# Patient Record
Sex: MALE | Race: WHITE | NOT HISPANIC OR LATINO | Employment: FULL TIME | URBAN - NONMETROPOLITAN AREA
[De-identification: names, ages, dates, MRNs, and addresses within clinical notes are randomized per-mention and may not be internally consistent; named-entity substitution may affect disease eponyms.]

---

## 2017-01-30 RX ORDER — OMEPRAZOLE 20 MG/1
CAPSULE, DELAYED RELEASE ORAL
Qty: 90 CAPSULE | Refills: 1 | OUTPATIENT
Start: 2017-01-30

## 2017-02-01 RX ORDER — OMEPRAZOLE 20 MG/1
CAPSULE, DELAYED RELEASE ORAL
Qty: 90 CAPSULE | Refills: 1 | OUTPATIENT
Start: 2017-02-01

## 2018-07-09 ENCOUNTER — TELEPHONE (OUTPATIENT)
Dept: PODIATRY | Age: 58
End: 2018-07-09

## 2019-09-18 ENCOUNTER — TELEPHONE (OUTPATIENT)
Dept: GASTROENTEROLOGY | Age: 59
End: 2019-09-18

## 2019-09-18 DIAGNOSIS — Z12.11 SPECIAL SCREENING FOR MALIGNANT NEOPLASMS, COLON: Primary | ICD-10-CM

## 2021-02-01 ENCOUNTER — LAB REQUISITION (OUTPATIENT)
Dept: LAB | Age: 61
End: 2021-02-01

## 2021-02-01 DIAGNOSIS — Z00.00 ENCOUNTER FOR GENERAL ADULT MEDICAL EXAMINATION WITHOUT ABNORMAL FINDINGS: ICD-10-CM

## 2021-02-01 PROCEDURE — PSEU8306 VITAMIN B12: Performed by: CLINICAL MEDICAL LABORATORY

## 2021-02-01 PROCEDURE — PSEU8229 VITAMIN D -25 HYDROXY: Performed by: CLINICAL MEDICAL LABORATORY

## 2021-02-01 PROCEDURE — 82607 VITAMIN B-12: CPT | Performed by: CLINICAL MEDICAL LABORATORY

## 2021-02-01 PROCEDURE — 82306 VITAMIN D 25 HYDROXY: CPT | Performed by: CLINICAL MEDICAL LABORATORY

## 2021-02-02 LAB
25(OH)D3+25(OH)D2 SERPL-MCNC: 34.4 NG/ML (ref 30–100)
VIT B12 SERPL-MCNC: 1774 PG/ML (ref 211–911)

## 2023-10-10 PROBLEM — N52.9 ERECTILE DYSFUNCTION: Status: ACTIVE | Noted: 2023-10-10

## 2023-10-10 PROBLEM — E78.5 HYPERLIPIDEMIA: Status: ACTIVE | Noted: 2023-10-10

## 2023-10-10 PROBLEM — N40.0 BENIGN PROSTATIC HYPERPLASIA WITHOUT LOWER URINARY TRACT SYMPTOMS: Status: ACTIVE | Noted: 2023-10-10

## 2023-10-10 PROBLEM — R97.20 ELEVATED PSA: Status: ACTIVE | Noted: 2023-10-10

## 2023-10-10 RX ORDER — IBUPROFEN 800 MG/1
1 TABLET ORAL DAILY PRN
COMMUNITY
End: 2023-10-11 | Stop reason: ALTCHOICE

## 2023-10-10 RX ORDER — SILDENAFIL 50 MG/1
TABLET, FILM COATED ORAL
COMMUNITY
End: 2023-10-11 | Stop reason: SDUPTHER

## 2023-10-11 ENCOUNTER — LAB (OUTPATIENT)
Dept: LAB | Facility: LAB | Age: 63
End: 2023-10-11
Payer: OTHER GOVERNMENT

## 2023-10-11 ENCOUNTER — OFFICE VISIT (OUTPATIENT)
Dept: PRIMARY CARE | Facility: CLINIC | Age: 63
End: 2023-10-11
Payer: OTHER GOVERNMENT

## 2023-10-11 VITALS
HEART RATE: 56 BPM | TEMPERATURE: 97.8 F | SYSTOLIC BLOOD PRESSURE: 126 MMHG | WEIGHT: 166.4 LBS | HEIGHT: 68 IN | DIASTOLIC BLOOD PRESSURE: 66 MMHG | BODY MASS INDEX: 25.22 KG/M2

## 2023-10-11 DIAGNOSIS — N52.9 ERECTILE DYSFUNCTION, UNSPECIFIED ERECTILE DYSFUNCTION TYPE: ICD-10-CM

## 2023-10-11 DIAGNOSIS — E78.49 OTHER HYPERLIPIDEMIA: ICD-10-CM

## 2023-10-11 DIAGNOSIS — N40.0 BENIGN PROSTATIC HYPERPLASIA WITHOUT LOWER URINARY TRACT SYMPTOMS: ICD-10-CM

## 2023-10-11 DIAGNOSIS — R97.20 ELEVATED PSA: ICD-10-CM

## 2023-10-11 DIAGNOSIS — Z00.00 ANNUAL PHYSICAL EXAM: ICD-10-CM

## 2023-10-11 LAB
ALBUMIN SERPL BCP-MCNC: 4.9 G/DL (ref 3.4–5)
ALP SERPL-CCNC: 34 U/L (ref 33–136)
ALT SERPL W P-5'-P-CCNC: 35 U/L (ref 10–52)
ANION GAP SERPL CALC-SCNC: 12 MMOL/L (ref 10–20)
AST SERPL W P-5'-P-CCNC: 22 U/L (ref 9–39)
BILIRUB SERPL-MCNC: 1.3 MG/DL (ref 0–1.2)
BUN SERPL-MCNC: 12 MG/DL (ref 6–23)
CALCIUM SERPL-MCNC: 9.7 MG/DL (ref 8.6–10.3)
CHLORIDE SERPL-SCNC: 104 MMOL/L (ref 98–107)
CHOLEST SERPL-MCNC: 213 MG/DL (ref 0–199)
CHOLESTEROL/HDL RATIO: 4.4
CO2 SERPL-SCNC: 28 MMOL/L (ref 21–32)
CREAT SERPL-MCNC: 1.01 MG/DL (ref 0.5–1.3)
ERYTHROCYTE [DISTWIDTH] IN BLOOD BY AUTOMATED COUNT: 12.3 % (ref 11.5–14.5)
GFR SERPL CREATININE-BSD FRML MDRD: 84 ML/MIN/1.73M*2
GLUCOSE SERPL-MCNC: 96 MG/DL (ref 74–99)
HCT VFR BLD AUTO: 45.6 % (ref 41–52)
HDLC SERPL-MCNC: 48.1 MG/DL
HGB BLD-MCNC: 15.4 G/DL (ref 13.5–17.5)
LDLC SERPL CALC-MCNC: 145 MG/DL (ref 140–190)
MCH RBC QN AUTO: 30.1 PG (ref 26–34)
MCHC RBC AUTO-ENTMCNC: 33.8 G/DL (ref 32–36)
MCV RBC AUTO: 89 FL (ref 80–100)
NON HDL CHOLESTEROL: 165 MG/DL (ref 0–149)
NRBC BLD-RTO: 0 /100 WBCS (ref 0–0)
PLATELET # BLD AUTO: 208 X10*3/UL (ref 150–450)
PMV BLD AUTO: 11.5 FL (ref 7.5–11.5)
POTASSIUM SERPL-SCNC: 4.3 MMOL/L (ref 3.5–5.3)
PROT SERPL-MCNC: 7.4 G/DL (ref 6.4–8.2)
PSA SERPL-MCNC: 2.14 NG/ML
RBC # BLD AUTO: 5.12 X10*6/UL (ref 4.5–5.9)
SODIUM SERPL-SCNC: 140 MMOL/L (ref 136–145)
TRIGL SERPL-MCNC: 101 MG/DL (ref 0–149)
VLDL: 20 MG/DL (ref 0–40)
WBC # BLD AUTO: 5.8 X10*3/UL (ref 4.4–11.3)

## 2023-10-11 PROCEDURE — 84153 ASSAY OF PSA TOTAL: CPT

## 2023-10-11 PROCEDURE — 99396 PREV VISIT EST AGE 40-64: CPT | Performed by: FAMILY MEDICINE

## 2023-10-11 PROCEDURE — 1036F TOBACCO NON-USER: CPT | Performed by: FAMILY MEDICINE

## 2023-10-11 PROCEDURE — 3008F BODY MASS INDEX DOCD: CPT | Performed by: FAMILY MEDICINE

## 2023-10-11 PROCEDURE — 80053 COMPREHEN METABOLIC PANEL: CPT

## 2023-10-11 PROCEDURE — 36415 COLL VENOUS BLD VENIPUNCTURE: CPT

## 2023-10-11 PROCEDURE — 80061 LIPID PANEL: CPT

## 2023-10-11 PROCEDURE — 85027 COMPLETE CBC AUTOMATED: CPT

## 2023-10-11 RX ORDER — SILDENAFIL 50 MG/1
50 TABLET, FILM COATED ORAL AS NEEDED
Qty: 30 TABLET | Refills: 3 | Status: SHIPPED | OUTPATIENT
Start: 2023-10-11

## 2023-10-11 ASSESSMENT — PATIENT HEALTH QUESTIONNAIRE - PHQ9
2. FEELING DOWN, DEPRESSED OR HOPELESS: NOT AT ALL
1. LITTLE INTEREST OR PLEASURE IN DOING THINGS: NOT AT ALL
SUM OF ALL RESPONSES TO PHQ9 QUESTIONS 1 AND 2: 0

## 2023-10-11 ASSESSMENT — ENCOUNTER SYMPTOMS
RESPIRATORY NEGATIVE: 1
HEMATOLOGIC/LYMPHATIC NEGATIVE: 1
PSYCHIATRIC NEGATIVE: 1
CONSTITUTIONAL NEGATIVE: 1
CARDIOVASCULAR NEGATIVE: 1
ENDOCRINE NEGATIVE: 1
ALLERGIC/IMMUNOLOGIC NEGATIVE: 1
GASTROINTESTINAL NEGATIVE: 1
MUSCULOSKELETAL NEGATIVE: 1
EYES NEGATIVE: 1

## 2023-10-11 NOTE — PROGRESS NOTES
Subjective     Patient ID: Brenden Neri is a 62 y.o. male who presents for Annual Exam (Requesting lab orders):    Problem List Items Addressed This Visit       Erectile dysfunction    Relevant Medications    sildenafil (Viagra) 50 mg tablet    Other Relevant Orders    CBC    Comprehensive Metabolic Panel    Lipid Panel    Hyperlipidemia    Relevant Orders    CBC    Comprehensive Metabolic Panel    Lipid Panel    Elevated PSA    Relevant Orders    CBC    Comprehensive Metabolic Panel    Lipid Panel    Benign prostatic hyperplasia without lower urinary tract symptoms    Relevant Orders    CBC    Comprehensive Metabolic Panel    Lipid Panel    Prostate Specific Antigen, Screen    Annual physical exam    Relevant Orders    CBC    Comprehensive Metabolic Panel    Lipid Panel    Follow Up In Primary Care - Health Maintenance     Other Visit Diagnoses       BMI 25.0-25.9,adult        Relevant Orders    CBC    Comprehensive Metabolic Panel    Lipid Panel           Past Medical History:   Diagnosis Date    Body mass index (BMI) 28.0-28.9, adult     BMI 28.0-28.9,adult    Overweight     Overweight      Past Surgical History:   Procedure Laterality Date    OTHER SURGICAL HISTORY  07/06/2022    Tooth extraction    OTHER SURGICAL HISTORY  07/06/2022    Surgery    OTHER SURGICAL HISTORY  07/07/2022    Colonoscopy      Family History   Problem Relation Name Age of Onset    Hyperlipidemia Father      Hypertension Father        Social History     Socioeconomic History    Marital status:      Spouse name: Not on file    Number of children: Not on file    Years of education: Not on file    Highest education level: Not on file   Occupational History    Not on file   Tobacco Use    Smoking status: Never    Smokeless tobacco: Never   Substance and Sexual Activity    Alcohol use: Never    Drug use: Never    Sexual activity: Not on file   Other Topics Concern    Not on file   Social History Narrative    Not on file     Social  Determinants of Health     Financial Resource Strain: Not on file   Food Insecurity: Not on file   Transportation Needs: Not on file   Physical Activity: Not on file   Stress: Not on file   Social Connections: Not on file   Intimate Partner Violence: Not on file   Housing Stability: Not on file      Pravastatin   Current Outpatient Medications   Medication Sig Dispense Refill    sildenafil (Viagra) 50 mg tablet Take 1 tablet (50 mg) by mouth if needed for erectile dysfunction. 30 tablet 3     No current facility-administered medications for this visit.       Immunization History   Administered Date(s) Administered    Flu vaccine (IIV4), preservative free *Check age/dose* 09/11/2018    Moderna COVID-19 vaccine, bivalent, blue cap/gray label *Check age/dose* 09/30/2022    Moderna SARS-CoV-2 Vaccination 03/17/2021, 04/15/2021, 12/06/2021, 04/23/2022    Tetanus toxoid, adsorbed 01/01/2016    Zoster vaccine, recombinant, adult (SHINGRIX) 03/01/2020, 03/09/2020, 06/15/2020, 07/01/2020        Review of Systems     Vitals:    10/11/23 0838   BP: 126/66   Pulse: 56   Temp: 36.6 °C (97.8 °F)     Vitals:    10/11/23 0838   Weight: 75.5 kg (166 lb 6.4 oz)      Physical Exam     ASSESSMENT/PLAN:

## 2023-10-11 NOTE — PROGRESS NOTES
Subjective     Patient ID: Brenden Neri is a 62 y.o. male who presents for Annual Exam (Requesting lab orders):    Problem List Items Addressed This Visit    None     Past Medical History:   Diagnosis Date    Body mass index (BMI) 28.0-28.9, adult     BMI 28.0-28.9,adult    Overweight     Overweight      Past Surgical History:   Procedure Laterality Date    OTHER SURGICAL HISTORY  07/06/2022    Tooth extraction    OTHER SURGICAL HISTORY  07/06/2022    Surgery    OTHER SURGICAL HISTORY  07/07/2022    Colonoscopy      Family History   Problem Relation Name Age of Onset    Hyperlipidemia Father      Hypertension Father        Social History     Socioeconomic History    Marital status:      Spouse name: Not on file    Number of children: Not on file    Years of education: Not on file    Highest education level: Not on file   Occupational History    Not on file   Tobacco Use    Smoking status: Never    Smokeless tobacco: Never   Substance and Sexual Activity    Alcohol use: Never    Drug use: Never    Sexual activity: Not on file   Other Topics Concern    Not on file   Social History Narrative    Not on file     Social Determinants of Health     Financial Resource Strain: Not on file   Food Insecurity: Not on file   Transportation Needs: Not on file   Physical Activity: Not on file   Stress: Not on file   Social Connections: Not on file   Intimate Partner Violence: Not on file   Housing Stability: Not on file      Pravastatin   Current Outpatient Medications   Medication Sig Dispense Refill    sildenafil (Viagra) 50 mg tablet Take by mouth.       No current facility-administered medications for this visit.       Immunization History   Administered Date(s) Administered    Flu vaccine (IIV4), preservative free *Check age/dose* 09/11/2018    Moderna COVID-19 vaccine, bivalent, blue cap/gray label *Check age/dose* 09/30/2022    Moderna SARS-CoV-2 Vaccination 03/17/2021, 04/15/2021, 12/06/2021, 04/23/2022    Tetanus  toxoid, adsorbed 01/01/2016    Zoster vaccine, recombinant, adult (SHINGRIX) 03/01/2020, 03/09/2020, 06/15/2020, 07/01/2020        Review of Systems     Vitals:    10/11/23 0838   BP: 126/66   Pulse: 56   Temp: 36.6 °C (97.8 °F)     Vitals:    10/11/23 0838   Weight: 75.5 kg (166 lb 6.4 oz)         Physical Exam     ASSESSMENT/PLAN:         Scribe Attestation  By signing my name below, I, Kassidy Germain LPN   , Scribe   attest that this documentation has been prepared under the direction and in the presence of Rian Hills MD.

## 2023-10-11 NOTE — PROGRESS NOTES
Ava Longoria is here today for his annual wellness visit.  He states that he has overall been feeling well and has no complaints at the present time.  He continues on his medications as noted without side effects.  Past medical social and family histories are as noted  Patient ID: Brenden Neri is a 62 y.o. male who presents for Annual Exam (Requesting lab orders):    Problem List Items Addressed This Visit       Erectile dysfunction    Relevant Medications    sildenafil (Viagra) 50 mg tablet    Other Relevant Orders    CBC    Comprehensive Metabolic Panel    Lipid Panel    Hyperlipidemia    Relevant Orders    CBC    Comprehensive Metabolic Panel    Lipid Panel    Elevated PSA    Relevant Orders    CBC    Comprehensive Metabolic Panel    Lipid Panel    Benign prostatic hyperplasia without lower urinary tract symptoms    Relevant Orders    CBC    Comprehensive Metabolic Panel    Lipid Panel    Prostate Specific Antigen, Screen    Annual physical exam    Relevant Orders    CBC    Comprehensive Metabolic Panel    Lipid Panel    Follow Up In Primary Care - Health Maintenance     Other Visit Diagnoses       BMI 25.0-25.9,adult        Relevant Orders    CBC    Comprehensive Metabolic Panel    Lipid Panel           Past Medical History:   Diagnosis Date    Body mass index (BMI) 28.0-28.9, adult     BMI 28.0-28.9,adult    Overweight     Overweight      Past Surgical History:   Procedure Laterality Date    OTHER SURGICAL HISTORY  07/06/2022    Tooth extraction    OTHER SURGICAL HISTORY  07/06/2022    Surgery    OTHER SURGICAL HISTORY  07/07/2022    Colonoscopy      Family History   Problem Relation Name Age of Onset    Hyperlipidemia Father      Hypertension Father        Social History     Socioeconomic History    Marital status:      Spouse name: Not on file    Number of children: Not on file    Years of education: Not on file    Highest education level: Not on file   Occupational History    Not on file   Tobacco  Use    Smoking status: Never    Smokeless tobacco: Never   Substance and Sexual Activity    Alcohol use: Never    Drug use: Never    Sexual activity: Not on file   Other Topics Concern    Not on file   Social History Narrative    Not on file     Social Determinants of Health     Financial Resource Strain: Not on file   Food Insecurity: Not on file   Transportation Needs: Not on file   Physical Activity: Not on file   Stress: Not on file   Social Connections: Not on file   Intimate Partner Violence: Not on file   Housing Stability: Not on file      Pravastatin   Current Outpatient Medications   Medication Sig Dispense Refill    sildenafil (Viagra) 50 mg tablet Take 1 tablet (50 mg) by mouth if needed for erectile dysfunction. 30 tablet 3     No current facility-administered medications for this visit.       Immunization History   Administered Date(s) Administered    Flu vaccine (IIV4), preservative free *Check age/dose* 09/11/2018    Moderna COVID-19 vaccine, bivalent, blue cap/gray label *Check age/dose* 09/30/2022    Moderna SARS-CoV-2 Vaccination 03/17/2021, 04/15/2021, 12/06/2021, 04/23/2022    Tetanus toxoid, adsorbed 01/01/2016    Zoster vaccine, recombinant, adult (SHINGRIX) 03/01/2020, 03/09/2020, 06/15/2020, 07/01/2020        Review of Systems   Constitutional: Negative.    HENT: Negative.     Eyes: Negative.    Respiratory: Negative.     Cardiovascular: Negative.    Gastrointestinal: Negative.    Endocrine: Negative.    Genitourinary: Negative.    Musculoskeletal: Negative.    Skin: Negative.    Allergic/Immunologic: Negative.    Hematological: Negative.    Psychiatric/Behavioral: Negative.     All other systems reviewed and are negative.       Vitals:    10/11/23 0838   BP: 126/66   Pulse: 56   Temp: 36.6 °C (97.8 °F)     Vitals:    10/11/23 0838   Weight: 75.5 kg (166 lb 6.4 oz)      Physical Exam  Constitutional:       General: He is not in acute distress.     Appearance: Normal appearance.   Neck:       Vascular: No carotid bruit.   Cardiovascular:      Rate and Rhythm: Normal rate and regular rhythm.      Pulses: Normal pulses.      Heart sounds: Normal heart sounds. No murmur heard.     No friction rub. No gallop.   Pulmonary:      Effort: Pulmonary effort is normal.      Breath sounds: Normal breath sounds.   Abdominal:      General: Abdomen is flat.      Palpations: Abdomen is soft. There is no mass.      Tenderness: There is no abdominal tenderness. There is no guarding.   Musculoskeletal:      Cervical back: Neck supple.   Neurological:      Mental Status: He is alert.   Psychiatric:         Mood and Affect: Mood normal.         Thought Content: Thought content normal.          ASSESSMENT/PLAN:  Annual wellness visit  Erectile dysfunction  History of hyperlipidemia    Check CBC CMP lipid profile and PSA  Cologuard and ophthalmology evaluations up-to-date  Immunizations up-to-date  Exercise regularly  Follow-up in 1 year and call as needed

## 2023-11-16 ENCOUNTER — WALK IN (OUTPATIENT)
Dept: URGENT CARE | Age: 63
End: 2023-11-16

## 2023-11-16 VITALS
RESPIRATION RATE: 16 BRPM | DIASTOLIC BLOOD PRESSURE: 64 MMHG | OXYGEN SATURATION: 98 % | SYSTOLIC BLOOD PRESSURE: 138 MMHG | TEMPERATURE: 98.5 F | HEART RATE: 86 BPM

## 2023-11-16 DIAGNOSIS — J01.00 ACUTE NON-RECURRENT MAXILLARY SINUSITIS: Primary | ICD-10-CM

## 2023-11-16 PROCEDURE — 99203 OFFICE O/P NEW LOW 30 MIN: CPT | Performed by: STUDENT IN AN ORGANIZED HEALTH CARE EDUCATION/TRAINING PROGRAM

## 2023-11-16 RX ORDER — AMOXICILLIN AND CLAVULANATE POTASSIUM 875; 125 MG/1; MG/1
1 TABLET, FILM COATED ORAL 2 TIMES DAILY
Qty: 20 TABLET | Refills: 0 | Status: SHIPPED | OUTPATIENT
Start: 2023-11-16 | End: 2023-11-26

## 2023-11-16 RX ORDER — LOSARTAN POTASSIUM 50 MG/1
50 TABLET ORAL 2 TIMES DAILY
COMMUNITY
Start: 2023-10-22

## 2023-11-16 ASSESSMENT — ENCOUNTER SYMPTOMS
RHINORRHEA: 0
HEADACHES: 0
SINUS PRESSURE: 1
CHILLS: 0
COUGH: 0
SORE THROAT: 0
FEVER: 0
DIZZINESS: 0

## 2024-01-11 ENCOUNTER — APPOINTMENT (OUTPATIENT)
Dept: PRIMARY CARE | Facility: CLINIC | Age: 64
End: 2024-01-11
Payer: OTHER GOVERNMENT

## 2024-02-19 ENCOUNTER — LAB REQUISITION (OUTPATIENT)
Dept: LAB | Age: 64
End: 2024-02-19

## 2024-02-19 DIAGNOSIS — J02.9 ACUTE PHARYNGITIS, UNSPECIFIED: ICD-10-CM

## 2024-02-19 PROCEDURE — PSEU8999 THROAT, BACTERIAL CULTURE: Performed by: CLINICAL MEDICAL LABORATORY

## 2024-02-19 PROCEDURE — 87070 CULTURE OTHR SPECIMN AEROBIC: CPT | Performed by: CLINICAL MEDICAL LABORATORY

## 2024-02-21 LAB — BACTERIA THROAT AEROBE CULT: NORMAL

## 2024-07-18 ENCOUNTER — LAB REQUISITION (OUTPATIENT)
Dept: LAB | Age: 64
End: 2024-07-18

## 2024-07-18 DIAGNOSIS — R53.83 OTHER FATIGUE: ICD-10-CM

## 2024-07-18 PROCEDURE — 86618 LYME DISEASE ANTIBODY: CPT | Performed by: CLINICAL MEDICAL LABORATORY

## 2024-07-18 PROCEDURE — PSEU9837 TESTOSTERONE, FREE AND TOTAL MALE BY ED: Performed by: CLINICAL MEDICAL LABORATORY

## 2024-07-18 PROCEDURE — PSEU8273 LYME IGG AND IGM ANTIBODY SCREEN WITH REFLEX TO IMMUNOBLOT: Performed by: CLINICAL MEDICAL LABORATORY

## 2024-07-18 PROCEDURE — 84403 ASSAY OF TOTAL TESTOSTERONE: CPT | Performed by: CLINICAL MEDICAL LABORATORY

## 2024-07-18 PROCEDURE — 84402 ASSAY OF FREE TESTOSTERONE: CPT | Performed by: CLINICAL MEDICAL LABORATORY

## 2024-07-19 LAB — B BURGDOR IGG+IGM SER QL IA: NEGATIVE

## 2024-07-26 LAB
TESTOST FREE SERPL-MCNC: 38 PG/ML (ref 47–244)
TESTOST SERPL-MCNC: 263.1 NG/DL (ref 300–720)

## 2024-08-22 ENCOUNTER — LAB REQUISITION (OUTPATIENT)
Dept: LAB | Age: 64
End: 2024-08-22

## 2024-08-22 DIAGNOSIS — R79.89 OTHER SPECIFIED ABNORMAL FINDINGS OF BLOOD CHEMISTRY: ICD-10-CM

## 2024-08-22 LAB — FSH SERPL-ACNC: 14.2 MUNITS/ML (ref 1.4–18.1)

## 2024-08-22 PROCEDURE — PSEU9837 TESTOSTERONE, FREE AND TOTAL MALE BY ED: Performed by: CLINICAL MEDICAL LABORATORY

## 2024-08-22 PROCEDURE — 83001 ASSAY OF GONADOTROPIN (FSH): CPT | Performed by: CLINICAL MEDICAL LABORATORY

## 2024-08-22 PROCEDURE — 84402 ASSAY OF FREE TESTOSTERONE: CPT | Performed by: CLINICAL MEDICAL LABORATORY

## 2024-08-22 PROCEDURE — PSEU8261 FOLLICLE STIMULATING HORMONE: Performed by: CLINICAL MEDICAL LABORATORY

## 2024-08-22 PROCEDURE — 84403 ASSAY OF TOTAL TESTOSTERONE: CPT | Performed by: CLINICAL MEDICAL LABORATORY

## 2024-08-28 DIAGNOSIS — N52.9 ERECTILE DYSFUNCTION, UNSPECIFIED ERECTILE DYSFUNCTION TYPE: ICD-10-CM

## 2024-08-29 RX ORDER — SILDENAFIL 50 MG/1
50 TABLET, FILM COATED ORAL AS NEEDED
Qty: 30 TABLET | Refills: 1 | Status: SHIPPED | OUTPATIENT
Start: 2024-08-29

## 2024-08-30 LAB
TESTOST FREE SERPL-MCNC: 46.3 PG/ML (ref 47–244)
TESTOST SERPL-MCNC: 221.9 NG/DL (ref 300–720)

## 2024-10-14 ENCOUNTER — APPOINTMENT (OUTPATIENT)
Dept: PRIMARY CARE | Facility: CLINIC | Age: 64
End: 2024-10-14
Payer: OTHER GOVERNMENT

## 2024-10-21 ENCOUNTER — APPOINTMENT (OUTPATIENT)
Dept: PRIMARY CARE | Facility: CLINIC | Age: 64
End: 2024-10-21
Payer: OTHER GOVERNMENT

## 2024-10-21 VITALS
TEMPERATURE: 98.2 F | WEIGHT: 173.4 LBS | SYSTOLIC BLOOD PRESSURE: 115 MMHG | DIASTOLIC BLOOD PRESSURE: 67 MMHG | BODY MASS INDEX: 27.21 KG/M2 | HEART RATE: 68 BPM | HEIGHT: 67 IN

## 2024-10-21 DIAGNOSIS — N52.9 ERECTILE DYSFUNCTION, UNSPECIFIED ERECTILE DYSFUNCTION TYPE: ICD-10-CM

## 2024-10-21 DIAGNOSIS — Z00.00 ANNUAL PHYSICAL EXAM: ICD-10-CM

## 2024-10-21 PROCEDURE — 1036F TOBACCO NON-USER: CPT | Performed by: FAMILY MEDICINE

## 2024-10-21 PROCEDURE — 99396 PREV VISIT EST AGE 40-64: CPT | Performed by: FAMILY MEDICINE

## 2024-10-21 PROCEDURE — 3008F BODY MASS INDEX DOCD: CPT | Performed by: FAMILY MEDICINE

## 2024-10-21 RX ORDER — SILDENAFIL 50 MG/1
50 TABLET, FILM COATED ORAL AS NEEDED
Qty: 30 TABLET | Refills: 1 | Status: SHIPPED | OUTPATIENT
Start: 2024-10-21

## 2024-10-21 ASSESSMENT — ENCOUNTER SYMPTOMS
PSYCHIATRIC NEGATIVE: 1
CONSTITUTIONAL NEGATIVE: 1
HEMATOLOGIC/LYMPHATIC NEGATIVE: 1
ENDOCRINE NEGATIVE: 1
ALLERGIC/IMMUNOLOGIC NEGATIVE: 1
GASTROINTESTINAL NEGATIVE: 1
RESPIRATORY NEGATIVE: 1
EYES NEGATIVE: 1
MUSCULOSKELETAL NEGATIVE: 1
CARDIOVASCULAR NEGATIVE: 1

## 2024-10-21 ASSESSMENT — PATIENT HEALTH QUESTIONNAIRE - PHQ9
2. FEELING DOWN, DEPRESSED OR HOPELESS: NOT AT ALL
SUM OF ALL RESPONSES TO PHQ9 QUESTIONS 1 AND 2: 0
1. LITTLE INTEREST OR PLEASURE IN DOING THINGS: NOT AT ALL

## 2024-10-21 ASSESSMENT — ACTIVITIES OF DAILY LIVING (ADL)
MANAGING_FINANCES: INDEPENDENT
TAKING_MEDICATION: INDEPENDENT
BATHING: INDEPENDENT
DRESSING: INDEPENDENT
GROCERY_SHOPPING: INDEPENDENT
DOING_HOUSEWORK: INDEPENDENT

## 2024-10-21 NOTE — PROGRESS NOTES
Ava Longoria is here for his annual wellness visit.  He states that he is overall been feeling well.  He has no new complaints at the present time.  His only medication is sildenafil as needed.  Eye examinations are up-to-date Cologuard up-to-date.    Patient ID: Brenden Neri is a 63 y.o. male who presents for Annual Exam:    Problem List Items Addressed This Visit       Erectile dysfunction    Relevant Medications    sildenafil (Viagra) 50 mg tablet    Annual physical exam      Past Medical History:   Diagnosis Date    Body mass index (BMI) 28.0-28.9, adult     BMI 28.0-28.9,adult    Overweight     Overweight      Past Surgical History:   Procedure Laterality Date    OTHER SURGICAL HISTORY  07/06/2022    Tooth extraction    OTHER SURGICAL HISTORY  07/06/2022    Surgery    OTHER SURGICAL HISTORY  07/07/2022    Colonoscopy      Family History   Problem Relation Name Age of Onset    Hyperlipidemia Father      Hypertension Father        Social History     Socioeconomic History    Marital status:      Spouse name: Not on file    Number of children: Not on file    Years of education: Not on file    Highest education level: Not on file   Occupational History    Not on file   Tobacco Use    Smoking status: Never    Smokeless tobacco: Never   Substance and Sexual Activity    Alcohol use: Yes     Alcohol/week: 2.0 standard drinks of alcohol     Types: 2 Cans of beer per week    Drug use: Never    Sexual activity: Not on file   Other Topics Concern    Not on file   Social History Narrative    Not on file     Social Drivers of Health     Financial Resource Strain: Not on file   Food Insecurity: Not on file   Transportation Needs: Not on file   Physical Activity: Not on file   Stress: Not on file   Social Connections: Not on file   Intimate Partner Violence: Not on file   Housing Stability: Not on file      Pravastatin   Current Outpatient Medications   Medication Sig Dispense Refill    sildenafil (Viagra) 50 mg tablet  Take 1 tablet (50 mg) by mouth if needed for erectile dysfunction. 30 tablet 1     No current facility-administered medications for this visit.       Immunization History   Administered Date(s) Administered    Flu vaccine (IIV4), preservative free *Check age/dose* 09/11/2018    Influenza, seasonal, injectable 09/21/2024    Moderna COVID-19 vaccine, bivalent, blue cap/gray label *Check age/dose* 09/30/2022    Moderna SARS-CoV-2 Vaccination 03/17/2021, 04/15/2021, 12/06/2021, 04/23/2022    Tetanus toxoid, adsorbed 01/01/2016    Zoster vaccine, recombinant, adult (SHINGRIX) 03/01/2020, 03/09/2020, 06/15/2020, 07/01/2020        Review of Systems   Constitutional: Negative.    HENT: Negative.     Eyes: Negative.    Respiratory: Negative.     Cardiovascular: Negative.    Gastrointestinal: Negative.    Endocrine: Negative.    Genitourinary: Negative.    Musculoskeletal: Negative.    Skin: Negative.    Allergic/Immunologic: Negative.    Hematological: Negative.    Psychiatric/Behavioral: Negative.     All other systems reviewed and are negative.       Vitals:    10/21/24 1517   BP: 115/67   Pulse: 68   Temp: 36.8 °C (98.2 °F)     Vitals:    10/21/24 1517   Weight: 78.7 kg (173 lb 6.4 oz)      Physical Exam  Constitutional:       General: He is not in acute distress.     Appearance: Normal appearance.   Neck:      Vascular: No carotid bruit.   Cardiovascular:      Rate and Rhythm: Normal rate and regular rhythm.      Pulses: Normal pulses.      Heart sounds: Normal heart sounds. No murmur heard.     No friction rub. No gallop.   Pulmonary:      Effort: Pulmonary effort is normal. No respiratory distress.      Breath sounds: Normal breath sounds. No wheezing or rales.   Abdominal:      General: Abdomen is flat. There is no distension.      Palpations: Abdomen is soft. There is no mass.      Tenderness: There is no abdominal tenderness. There is no guarding.   Musculoskeletal:      Cervical back: Neck supple.   Neurological:       Mental Status: He is alert.          ASSESSMENT/PLAN: Annual wellness visit.  Check CBC CMP lipid profile and PSA.  Recommended colonoscopy but patient prefers Cologuard which is up-to-date.  Immunizations are up-to-date.  Exercise regularly.    Erectile dysfunction.  Refill given for sildenafil as noted.    Follow-up in 1 year pending above and call as needed

## 2024-10-23 ENCOUNTER — LAB (OUTPATIENT)
Dept: LAB | Facility: LAB | Age: 64
End: 2024-10-23
Payer: OTHER GOVERNMENT

## 2024-10-23 DIAGNOSIS — Z00.00 ANNUAL PHYSICAL EXAM: ICD-10-CM

## 2024-10-23 LAB
ALBUMIN SERPL BCP-MCNC: 4.5 G/DL (ref 3.4–5)
ALP SERPL-CCNC: 37 U/L (ref 33–136)
ALT SERPL W P-5'-P-CCNC: 19 U/L (ref 10–52)
ANION GAP SERPL CALC-SCNC: 11 MMOL/L (ref 10–20)
AST SERPL W P-5'-P-CCNC: 19 U/L (ref 9–39)
BILIRUB SERPL-MCNC: 1.1 MG/DL (ref 0–1.2)
BUN SERPL-MCNC: 14 MG/DL (ref 6–23)
CALCIUM SERPL-MCNC: 9.1 MG/DL (ref 8.6–10.3)
CHLORIDE SERPL-SCNC: 105 MMOL/L (ref 98–107)
CHOLEST SERPL-MCNC: 215 MG/DL (ref 0–199)
CHOLESTEROL/HDL RATIO: 4.3
CO2 SERPL-SCNC: 28 MMOL/L (ref 21–32)
CREAT SERPL-MCNC: 1 MG/DL (ref 0.5–1.3)
EGFRCR SERPLBLD CKD-EPI 2021: 84 ML/MIN/1.73M*2
ERYTHROCYTE [DISTWIDTH] IN BLOOD BY AUTOMATED COUNT: 12.8 % (ref 11.5–14.5)
GLUCOSE SERPL-MCNC: 85 MG/DL (ref 74–99)
HCT VFR BLD AUTO: 46.4 % (ref 41–52)
HDLC SERPL-MCNC: 50 MG/DL
HGB BLD-MCNC: 15.7 G/DL (ref 13.5–17.5)
LDLC SERPL CALC-MCNC: 148 MG/DL
MCH RBC QN AUTO: 30.1 PG (ref 26–34)
MCHC RBC AUTO-ENTMCNC: 33.8 G/DL (ref 32–36)
MCV RBC AUTO: 89 FL (ref 80–100)
NON HDL CHOLESTEROL: 165 MG/DL (ref 0–149)
NRBC BLD-RTO: 0 /100 WBCS (ref 0–0)
PLATELET # BLD AUTO: 210 X10*3/UL (ref 150–450)
POTASSIUM SERPL-SCNC: 4.6 MMOL/L (ref 3.5–5.3)
PROT SERPL-MCNC: 6.7 G/DL (ref 6.4–8.2)
PSA SERPL-MCNC: 2.76 NG/ML
RBC # BLD AUTO: 5.22 X10*6/UL (ref 4.5–5.9)
SODIUM SERPL-SCNC: 139 MMOL/L (ref 136–145)
TRIGL SERPL-MCNC: 87 MG/DL (ref 0–149)
VLDL: 17 MG/DL (ref 0–40)
WBC # BLD AUTO: 6.4 X10*3/UL (ref 4.4–11.3)

## 2024-10-23 PROCEDURE — 84153 ASSAY OF PSA TOTAL: CPT

## 2024-10-23 PROCEDURE — 80053 COMPREHEN METABOLIC PANEL: CPT

## 2024-10-23 PROCEDURE — 36415 COLL VENOUS BLD VENIPUNCTURE: CPT

## 2024-10-23 PROCEDURE — 85027 COMPLETE CBC AUTOMATED: CPT

## 2024-10-23 PROCEDURE — 80061 LIPID PANEL: CPT

## 2024-10-24 ENCOUNTER — TELEPHONE (OUTPATIENT)
Dept: PRIMARY CARE | Facility: CLINIC | Age: 64
End: 2024-10-24
Payer: OTHER GOVERNMENT

## 2024-10-24 DIAGNOSIS — E78.49 OTHER HYPERLIPIDEMIA: Primary | ICD-10-CM

## 2024-10-24 NOTE — TELEPHONE ENCOUNTER
Spoke with patient to advise.  States that he is willing to try a medication for this.  Would like script to go to [facility].

## 2024-10-24 NOTE — TELEPHONE ENCOUNTER
----- Message from Rian Hills sent at 10/23/2024  2:25 PM EDT -----  Labs normal exc chol elevated at 215.  Should consider trying another med for this

## 2024-11-06 RX ORDER — ROSUVASTATIN CALCIUM 5 MG/1
5 TABLET, COATED ORAL DAILY
Qty: 100 TABLET | Refills: 3 | Status: SHIPPED | OUTPATIENT
Start: 2024-11-06 | End: 2025-12-11

## 2024-11-21 ENCOUNTER — HOSPITAL ENCOUNTER (OUTPATIENT)
Dept: RADIOLOGY | Facility: CLINIC | Age: 64
Discharge: HOME | End: 2024-11-21
Payer: OTHER GOVERNMENT

## 2024-11-21 ENCOUNTER — OFFICE VISIT (OUTPATIENT)
Dept: PRIMARY CARE | Facility: CLINIC | Age: 64
End: 2024-11-21
Payer: OTHER GOVERNMENT

## 2024-11-21 VITALS
SYSTOLIC BLOOD PRESSURE: 130 MMHG | BODY MASS INDEX: 27.31 KG/M2 | HEART RATE: 61 BPM | WEIGHT: 174 LBS | DIASTOLIC BLOOD PRESSURE: 70 MMHG | HEIGHT: 67 IN

## 2024-11-21 DIAGNOSIS — M25.572 ACUTE LEFT ANKLE PAIN: Primary | ICD-10-CM

## 2024-11-21 DIAGNOSIS — M25.572 ACUTE LEFT ANKLE PAIN: ICD-10-CM

## 2024-11-21 PROCEDURE — 1036F TOBACCO NON-USER: CPT | Performed by: FAMILY MEDICINE

## 2024-11-21 PROCEDURE — 73610 X-RAY EXAM OF ANKLE: CPT | Mod: LT

## 2024-11-21 PROCEDURE — 73610 X-RAY EXAM OF ANKLE: CPT | Mod: LEFT SIDE | Performed by: RADIOLOGY

## 2024-11-21 PROCEDURE — 3008F BODY MASS INDEX DOCD: CPT | Performed by: FAMILY MEDICINE

## 2024-11-21 PROCEDURE — 99213 OFFICE O/P EST LOW 20 MIN: CPT | Performed by: FAMILY MEDICINE

## 2024-11-21 RX ORDER — IBUPROFEN 800 MG/1
800 TABLET ORAL EVERY 6 HOURS PRN
COMMUNITY

## 2024-11-21 RX ORDER — EZETIMIBE 10 MG/1
10 TABLET ORAL DAILY
COMMUNITY
Start: 2022-11-04

## 2024-11-21 RX ORDER — NAPROXEN 500 MG/1
500 TABLET ORAL 2 TIMES DAILY PRN
Qty: 60 TABLET | Refills: 5 | Status: SHIPPED | OUTPATIENT
Start: 2024-11-21 | End: 2025-07-19

## 2024-11-21 RX ORDER — VARDENAFIL HYDROCHLORIDE 10 MG/1
10 TABLET ORAL AS NEEDED
COMMUNITY

## 2024-11-21 ASSESSMENT — ENCOUNTER SYMPTOMS
SHORTNESS OF BREATH: 0
DIZZINESS: 0
CHILLS: 0
HEADACHES: 0
FATIGUE: 0
CHEST TIGHTNESS: 0

## 2024-11-21 ASSESSMENT — PATIENT HEALTH QUESTIONNAIRE - PHQ9
1. LITTLE INTEREST OR PLEASURE IN DOING THINGS: NOT AT ALL
2. FEELING DOWN, DEPRESSED OR HOPELESS: NOT AT ALL
SUM OF ALL RESPONSES TO PHQ9 QUESTIONS 1 AND 2: 0

## 2024-11-21 NOTE — PROGRESS NOTES
"Subjective   Patient ID: Brenden Neri is a 63 y.o. male who presents for Sick Visit (Left ankle pain carried a rafa tree up the steps).    L ankle pain   - ongoing for one day   - reports he was carrying a rafa tree yesterday up stairs and thinks he may have aggravated it   - denies any trauma to the foot or ankle   - pain is on the inferior edge of the maleolus   - pain is worse with weight bearing, and improves when sitting/resting            Review of Systems   Constitutional:  Negative for chills and fatigue.   Respiratory:  Negative for chest tightness and shortness of breath.    Neurological:  Negative for dizziness and headaches.       Objective   /70   Pulse 61   Ht 1.702 m (5' 7\")   Wt 78.9 kg (174 lb)   BMI 27.25 kg/m²     Physical Exam  Constitutional:       General: He is not in acute distress.     Appearance: Normal appearance.   HENT:      Mouth/Throat:      Mouth: Mucous membranes are moist.      Pharynx: Oropharynx is clear.   Neurological:      Mental Status: He is alert.   Psychiatric:         Mood and Affect: Mood normal.         Behavior: Behavior normal.         Assessment/Plan   Problem List Items Addressed This Visit    None  Visit Diagnoses         Codes    Acute left ankle pain    -  Primary M25.572    stable   - given [facility] ankle rules will check XR   - naproxen PRN for pain   - consider referral pending results     Relevant Medications    naproxen (Naprosyn) 500 mg tablet    Other Relevant Orders    XR ankle left 3+ views               "

## 2024-12-05 PROCEDURE — 84402 ASSAY OF FREE TESTOSTERONE: CPT | Performed by: CLINICAL MEDICAL LABORATORY

## 2024-12-05 PROCEDURE — 84403 ASSAY OF TOTAL TESTOSTERONE: CPT | Performed by: CLINICAL MEDICAL LABORATORY

## 2024-12-05 PROCEDURE — PSEU9837 TESTOSTERONE, FREE AND TOTAL MALE BY ED: Performed by: CLINICAL MEDICAL LABORATORY

## 2024-12-06 ENCOUNTER — LAB REQUISITION (OUTPATIENT)
Dept: LAB | Age: 64
End: 2024-12-06

## 2024-12-06 DIAGNOSIS — E29.1 TESTICULAR HYPOFUNCTION: ICD-10-CM

## 2024-12-13 LAB
TESTOST FREE SERPL-MCNC: 14.3 PG/ML (ref 47–244)
TESTOST SERPL-MCNC: 104.7 NG/DL (ref 300–720)

## 2025-03-01 ENCOUNTER — OFFICE VISIT (OUTPATIENT)
Dept: URGENT CARE | Age: 65
End: 2025-03-01
Payer: MEDICARE

## 2025-03-01 VITALS
HEART RATE: 59 BPM | WEIGHT: 168 LBS | BODY MASS INDEX: 25.46 KG/M2 | SYSTOLIC BLOOD PRESSURE: 144 MMHG | DIASTOLIC BLOOD PRESSURE: 87 MMHG | HEIGHT: 68 IN | RESPIRATION RATE: 20 BRPM | TEMPERATURE: 98.4 F | OXYGEN SATURATION: 96 %

## 2025-03-01 DIAGNOSIS — R68.89 FLU-LIKE SYMPTOMS: ICD-10-CM

## 2025-03-01 DIAGNOSIS — J06.9 VIRAL URI: Primary | ICD-10-CM

## 2025-03-01 LAB
POC RAPID INFLUENZA A: NEGATIVE
POC RAPID INFLUENZA B: NEGATIVE
POC SARS-COV-2 AG BINAX: NORMAL

## 2025-03-01 RX ORDER — BROMPHENIRAMINE MALEATE, PSEUDOEPHEDRINE HYDROCHLORIDE, AND DEXTROMETHORPHAN HYDROBROMIDE 2; 30; 10 MG/5ML; MG/5ML; MG/5ML
10 SYRUP ORAL 4 TIMES DAILY PRN
Qty: 120 ML | Refills: 0 | Status: SHIPPED | OUTPATIENT
Start: 2025-03-01 | End: 2025-03-11

## 2025-03-01 ASSESSMENT — PAIN SCALES - GENERAL: PAINLEVEL_OUTOF10: 0-NO PAIN

## 2025-03-01 NOTE — PROGRESS NOTES
Subjective   Patient ID: Brenden Neri is a 64 y.o. male. They present today with a chief complaint of Nasal Congestion (Chest congestion x 4 days ).    Patient disposition: Home    History of Present Illness  HPI  Runny nose, cough, chest congestion for 4 days.  No fever or chills.  No sick contacts.  No history of asthma or bronchitis.  No GI symptoms.  No ear pain.  No headache.  Has been taking DayQuil and NyQuil with minor improvement of symptoms.  Postnasal drip is worsening.      Past Medical History  Allergies as of 03/01/2025 - Reviewed 03/01/2025   Allergen Reaction Noted    Pravastatin Headache 10/10/2023       (Not in a hospital admission)       Current Outpatient Medications   Medication Sig Dispense Refill    ezetimibe (Zetia) 10 mg tablet Take 1 tablet (10 mg) by mouth once daily.      ibuprofen 800 mg tablet Take 1 tablet (800 mg) by mouth every 6 hours if needed.      multivit-min/ferrous fumarate (MULTI VITAMIN ORAL) Take 1 tablet by mouth once daily.      naproxen (Naprosyn) 500 mg tablet Take 1 tablet (500 mg) by mouth 2 times a day as needed for mild pain (1 - 3) (pain). 60 tablet 5    rosuvastatin (Crestor) 5 mg tablet Take 1 tablet (5 mg) by mouth once daily. 100 tablet 3    sildenafil (Viagra) 50 mg tablet Take 1 tablet (50 mg) by mouth if needed for erectile dysfunction. 30 tablet 1    vardenafil (Levitra) 10 mg tablet Take 1 tablet (10 mg) by mouth if needed. (Patient not taking: Reported on 11/21/2024)       No current facility-administered medications for this visit.       Patient Active Problem List   Diagnosis    Erectile dysfunction    Hyperlipidemia    Elevated PSA    Benign prostatic hyperplasia without lower urinary tract symptoms    Annual physical exam       Past Surgical History:   Procedure Laterality Date    OTHER SURGICAL HISTORY  07/06/2022    Tooth extraction    OTHER SURGICAL HISTORY  07/06/2022    Surgery    OTHER SURGICAL HISTORY  07/07/2022    Colonoscopy        reports  "that he has never smoked. He has never used smokeless tobacco. He reports current alcohol use of about 2.0 standard drinks of alcohol per week. He reports that he does not use drugs.    Review of Systems  As noted in HPI. ROS otherwise negative unless noted.       Objective    Vitals:    03/01/25 1122   BP: 144/87   BP Location: Left arm   Patient Position: Sitting   Pulse: 59   Resp: 20   Temp: 36.9 °C (98.4 °F)   TempSrc: Oral   SpO2: 96%   Weight: 76.2 kg (168 lb)   Height: 1.727 m (5' 8\")     No LMP for male patient.    Physical Exam  Constitutional: vital signs reviewed. Well developed, well nourished. patient alert and patient without distress.   Head and Face: Normal and atraumatic.  Palpation of the face and sinuses: Normal.  Ears, Nose, Mouth, and Throat:   Hearing: Normal.  External inspection of nose: Normal.   Lips, teeth, tongue and gums: Normal and well hydrated. External inspection of ears: Normal. Ear canals and TMs: Normal.  Posterior pharynx moist, no exudate, symmetric, no abscess, and with post nasal drip.  Nasal mucosa: Congested  Neck: No neck mass was observed. Supple. normal muscle tone.   Cardiovascular: Heart rate normal, normal S1 and S2, no gallops, no murmurs and no pericardial rub. Rhythm: Normal.  Pulmonary: No respiratory distress. Palpation of chest: Normal. Clear bilateral breath sounds.   Lymphatic: No cervical lymphadenopathy  Psych: Normal mood and affect        Procedures    Point of Care Test & Imaging Results from this visit  Results for orders placed or performed in visit on 03/01/25   POCT Influenza A/B manually resulted    Collection Time: 03/01/25 11:29 AM   Result Value Ref Range    POC Rapid Influenza A Negative Negative    POC Rapid Influenza B Negative Negative   POCT Covid-19 Rapid Antigen    Collection Time: 03/01/25 11:35 AM   Result Value Ref Range    POC MELLO-COV-2 AG  Presumptive negative test for SARS-CoV-2 (no antigen detected)     Presumptive negative test for " SARS-CoV-2 (no antigen detected)            Diagnostic study results (if any) were reviewed.    Assessment/Plan   Allergies, medications, history, and pertinent labs/EKGs/Imaging reviewed.    Medical Decision Making  See note    Orders and Diagnoses  Diagnoses and all orders for this visit:  Nasal congestion  -     POCT Covid-19 Rapid Antigen  -     POCT Influenza A/B manually resulted      Medical Admin Record      Follow Up Instructions  No follow-ups on file.    At time of discharge patient was clinically well-appearing and HDS for outpatient management. The patient and/or family was educated regarding diagnosis, supportive care, OTC and Rx medications. The patient and/or family was given the opportunity to ask questions prior to discharge and all questions answered. They verbalized understanding of my discussion of the plans for treatment, expected course, indications to return to  or seek further evaluation in ED, and the need for timely follow up as directed.      Electronically signed by [facility]

## 2025-05-05 DIAGNOSIS — N52.9 ERECTILE DYSFUNCTION, UNSPECIFIED ERECTILE DYSFUNCTION TYPE: ICD-10-CM

## 2025-05-06 RX ORDER — SILDENAFIL 50 MG/1
TABLET, FILM COATED ORAL
Qty: 30 TABLET | Refills: 3 | Status: SHIPPED | OUTPATIENT
Start: 2025-05-06

## 2025-07-10 ENCOUNTER — LAB REQUISITION (OUTPATIENT)
Dept: LAB | Age: 65
End: 2025-07-10

## 2025-07-10 DIAGNOSIS — R79.89 OTHER SPECIFIED ABNORMAL FINDINGS OF BLOOD CHEMISTRY: ICD-10-CM

## 2025-07-10 PROCEDURE — PSEU9837 TESTOSTERONE, FREE AND TOTAL MALE BY ED: Performed by: CLINICAL MEDICAL LABORATORY

## 2025-07-10 PROCEDURE — 84402 ASSAY OF FREE TESTOSTERONE: CPT | Performed by: CLINICAL MEDICAL LABORATORY

## 2025-07-10 PROCEDURE — 84403 ASSAY OF TOTAL TESTOSTERONE: CPT | Performed by: CLINICAL MEDICAL LABORATORY

## 2025-07-17 LAB
TESTOST FREE SERPL-MCNC: 20.4 PG/ML (ref 47–244)
TESTOST SERPL-MCNC: 179.3 NG/DL (ref 300–720)

## 2025-08-06 DIAGNOSIS — Z12.12 SCREENING FOR COLORECTAL CANCER: ICD-10-CM

## 2025-08-06 DIAGNOSIS — Z12.11 SCREENING FOR COLORECTAL CANCER: ICD-10-CM
